# Patient Record
Sex: FEMALE | Race: WHITE | Employment: UNEMPLOYED | URBAN - METROPOLITAN AREA
[De-identification: names, ages, dates, MRNs, and addresses within clinical notes are randomized per-mention and may not be internally consistent; named-entity substitution may affect disease eponyms.]

---

## 2020-05-09 ENCOUNTER — APPOINTMENT (OUTPATIENT)
Dept: GENERAL RADIOLOGY | Age: 45
End: 2020-05-09
Payer: COMMERCIAL

## 2020-05-09 ENCOUNTER — HOSPITAL ENCOUNTER (EMERGENCY)
Age: 45
Discharge: HOME | End: 2020-05-09
Attending: EMERGENCY MEDICINE
Payer: COMMERCIAL

## 2020-05-09 VITALS
HEIGHT: 68 IN | TEMPERATURE: 98.4 F | SYSTOLIC BLOOD PRESSURE: 148 MMHG | DIASTOLIC BLOOD PRESSURE: 96 MMHG | WEIGHT: 155 LBS | RESPIRATION RATE: 15 BRPM | OXYGEN SATURATION: 96 % | BODY MASS INDEX: 23.49 KG/M2 | HEART RATE: 93 BPM

## 2020-05-09 PROCEDURE — 70360 X-RAY EXAM OF NECK: CPT

## 2020-05-09 PROCEDURE — 99283 EMERGENCY DEPT VISIT LOW MDM: CPT

## 2020-05-09 RX ORDER — CITALOPRAM 10 MG/1
10 TABLET ORAL DAILY
COMMUNITY

## 2020-05-09 NOTE — ED PROVIDER NOTES
[id number] [facility] RESIDENT NOTE       Date of evaluation: 5/9/2020    Chief Complaint     No chief complaint on file. History of Present Illness     Danie Moody is a 40 y.o. female who presents today with concern for difficulty breathing. Patient states that over the last few weeks she's had a sensation that she is breathing through a straw. She states that this has not gotten worse since she first noticed this but has persisted. She has contacted her primary care physician multiple times and was diagnosed with a upper respiratory tract infection. She tried inhalers without improvement. She denies any wheezing or noisy breathing. She denies any change in voice, muffled voice or difficulty swallowing. She denies any episodes of choking. No recent fevers, cough, nausea, vomiting, shortness of breath, chest pain or changes in bladder/bowel function. On chart review the patient has a history of previous throat pain/globe sensation, seen ear nose and throat in 2018. Nasopharyngoscopy at that time showed no acute concerns. She has been recommended to try various GERD medications for her symptoms in the past which she admits she has not been consistent with taking daily. Review of Systems     Review of Systems    See HPI. A full 10-point review of systems wasconducted and is otherwise negative unless noted above. Past Medical, Surgical, Family, and Social History      She has a past medical history of Anxiety and Panic disorder. She has no past surgical history on file. Her family history is not on file. She reports that she has quit smoking. She has never used smokeless tobacco. She reports current alcohol use of about 3.0 standard drinks of alcohol per week. She reports previous drug use.     Medications     Discharge Medication List as of 5/9/2020 11:44 AM      CONTINUE these medications which have NOT CHANGED    Details   citalopram (Paige Peña) patient wasin no acute distress with reassuring vitals and no signs of impending hemodynamic or respiratory collapse. Patient presetns to the ED with reports of difficulty breathing for multuple weeks. She has a normal exam here, including no stridor, changes in voice, noisy breathing or abnormalities on neck exam. Neck films do show irregularities in vocal cords. Discussed options with patient, agreed that out-patient ENT would be most appropriate. Thoroughly discussed return precautions with patient in written and verbal settings. Given normal exam here and time course, feel she is safe for discharge. Patient was treated with below medications:  Medications - No data to display    At this time the patient has been deemed safe for discharge. Verbal discharge instructionsincluding strict return precautions for worsening or new symptoms have been communicated. The patient was advised to follow up with ENT. Clinical Impression     1. Vocal cord anomaly        Disposition     PATIENT REFERREDTO:  Carolina Mcdonnell DO  [id number]   [facility]  [address] [place] [address]  [phone]    Schedule an appointment as soon as possible for a visit       [facility]  [address] [place] [id number] [phone]  Go to   If symptoms worsen      DISCHARGE MEDICATIONS:     Discharge Medication List as of 5/9/2020 11:44 AM          DISPOSITION            Colby Loo MD  Resident  05/12/20 [id number]